# Patient Record
Sex: MALE | Race: BLACK OR AFRICAN AMERICAN | NOT HISPANIC OR LATINO | URBAN - METROPOLITAN AREA
[De-identification: names, ages, dates, MRNs, and addresses within clinical notes are randomized per-mention and may not be internally consistent; named-entity substitution may affect disease eponyms.]

---

## 2023-12-28 ENCOUNTER — HOSPITAL ENCOUNTER (EMERGENCY)
Facility: HOSPITAL | Age: 58
Discharge: HOME | End: 2023-12-28

## 2023-12-28 VITALS
BODY MASS INDEX: 21.82 KG/M2 | WEIGHT: 170 LBS | TEMPERATURE: 98.4 F | HEIGHT: 74 IN | SYSTOLIC BLOOD PRESSURE: 143 MMHG | DIASTOLIC BLOOD PRESSURE: 86 MMHG | HEART RATE: 95 BPM | RESPIRATION RATE: 16 BRPM | OXYGEN SATURATION: 99 %

## 2023-12-28 DIAGNOSIS — L72.3 SEBACEOUS CYST: Primary | ICD-10-CM

## 2023-12-28 PROCEDURE — 99284 EMERGENCY DEPT VISIT MOD MDM: CPT | Performed by: PHYSICIAN ASSISTANT

## 2023-12-28 PROCEDURE — 2500000005 HC RX 250 GENERAL PHARMACY W/O HCPCS: Performed by: PHYSICIAN ASSISTANT

## 2023-12-28 PROCEDURE — 10060 I&D ABSCESS SIMPLE/SINGLE: CPT | Performed by: PHYSICIAN ASSISTANT

## 2023-12-28 PROCEDURE — 99283 EMERGENCY DEPT VISIT LOW MDM: CPT

## 2023-12-28 RX ORDER — KETOROLAC TROMETHAMINE 10 MG/1
10 TABLET, FILM COATED ORAL EVERY 6 HOURS PRN
Qty: 20 TABLET | Refills: 0 | Status: SHIPPED | OUTPATIENT
Start: 2023-12-28

## 2023-12-28 RX ORDER — CEPHALEXIN 500 MG/1
500 CAPSULE ORAL 4 TIMES DAILY
Qty: 28 CAPSULE | Refills: 0 | Status: SHIPPED | OUTPATIENT
Start: 2023-12-28 | End: 2024-01-04

## 2023-12-28 RX ORDER — LIDOCAINE HYDROCHLORIDE 10 MG/ML
5 INJECTION INFILTRATION; PERINEURAL ONCE
Status: COMPLETED | OUTPATIENT
Start: 2023-12-28 | End: 2023-12-28

## 2023-12-28 RX ORDER — SULFAMETHOXAZOLE AND TRIMETHOPRIM 800; 160 MG/1; MG/1
1 TABLET ORAL 2 TIMES DAILY
Qty: 14 TABLET | Refills: 0 | Status: SHIPPED | OUTPATIENT
Start: 2023-12-28 | End: 2024-01-04

## 2023-12-28 RX ADMIN — LIDOCAINE HYDROCHLORIDE 50 MG: 10 INJECTION, SOLUTION INFILTRATION; PERINEURAL at 14:25

## 2023-12-28 ASSESSMENT — COLUMBIA-SUICIDE SEVERITY RATING SCALE - C-SSRS
1. IN THE PAST MONTH, HAVE YOU WISHED YOU WERE DEAD OR WISHED YOU COULD GO TO SLEEP AND NOT WAKE UP?: NO
2. HAVE YOU ACTUALLY HAD ANY THOUGHTS OF KILLING YOURSELF?: NO
6. HAVE YOU EVER DONE ANYTHING, STARTED TO DO ANYTHING, OR PREPARED TO DO ANYTHING TO END YOUR LIFE?: NO

## 2023-12-28 NOTE — ED PROVIDER NOTES
"HPI   Chief Complaint   Patient presents with    Abscess       This is a 58-year-old male with no significant past medical history who presents with concern for a \"bump\" to his posterior neck.  Was there for about a year, states he saw a doctor but was told that it will eventually go away.  A few days ago, he states it busted open with expression of thick yellow material.  Denies fever, chills, pain radiating to the arms, nausea, vomiting, extremity numbness.                          No data recorded                Patient History   Past Medical History:   Diagnosis Date    Unspecified sexually transmitted disease     STD (male)     Past Surgical History:   Procedure Laterality Date    OTHER SURGICAL HISTORY  05/12/2017    Surgery Scrotum Excision Of Lesion    OTHER SURGICAL HISTORY  05/12/2017    Therapeutic Cystoscopy     No family history on file.  Social History     Tobacco Use    Smoking status: Not on file    Smokeless tobacco: Not on file   Substance Use Topics    Alcohol use: Not on file    Drug use: Not on file       Physical Exam   ED Triage Vitals [12/28/23 1319]   Temp Heart Rate Resp BP   36.9 °C (98.4 °F) 95 16 143/86      SpO2 Temp Source Heart Rate Source Patient Position   99 % Tympanic -- --      BP Location FiO2 (%)     -- --       Physical Exam  Vitals and nursing note reviewed.   Constitutional:       Appearance: Normal appearance.   HENT:      Head: Normocephalic and atraumatic.   Musculoskeletal:         General: Normal range of motion.   Skin:     Comments: 4 cm area of fluctuance in the posterior neck with some erythema, no warmth, open centrally, tender to palpation   Neurological:      Mental Status: He is alert and oriented to person, place, and time.         ED Course & MDM   Diagnoses as of 12/28/23 1448   Sebaceous cyst       Medical Decision Making  58-year-old male, is alert and oriented x 3, afebrile and hemodynamically stable.  Presenting with concern for bump to his posterior " neck.    Examination reveals large area of erythema and fluctuance approximately 4 cm in diameter at the posterior neck with an open area centrally, tender to palpation.  The wound was anesthetized using 1% lidocaine without epinephrine, subsequently palpated with expression of thick white/yellow cheesy material consistent with a sebaceous cyst.  The wound was packed with quarter inch iodoform gauze strip, will prescribe the patient a course of Keflex and Bactrim, given a prescription for Toradol and was discharged in stable condition, advised to follow-up with his primary care provider within the next week for a wound check, given emergency department return precautions.        Procedure  Procedures     Darrell Porter PA-C  12/28/23 1453

## 2023-12-28 NOTE — ED TRIAGE NOTES
Pt c/o abscess on back of neck x1 year. Pt states seen by PCP for it when it first appeared and was told it would go away on its own. Pt states ruptured on 12/24 with yellow/brown/bloody drainage. Pt endorses N/V/HA.

## 2023-12-28 NOTE — Clinical Note
Monty Pacheco was seen and treated in our emergency department on 12/28/2023.  He may return to school on 01/02/2024.      If you have any questions or concerns, please don't hesitate to call.      Darrell Porter PA-C

## 2023-12-28 NOTE — ED PROCEDURE NOTE
Procedure  Incision and Drainage    Performed by: Darrell Porter PA-C  Authorized by: Darrell Porter PA-C    Consent:     Consent obtained:  Verbal  Universal protocol:     Patient identity confirmed:  Verbally with patient  Location:     Type:  Cyst    Size:  4 cm    Location: posterior neck.  Pre-procedure details:     Skin preparation:  Chlorhexidine  Anesthesia:     Anesthesia method:  Local infiltration    Local anesthetic:  Lidocaine 1% w/o epi  Procedure type:     Complexity:  Simple  Procedure details:     Incision depth:  Dermal    Wound management:  Probed and deloculated    Drainage characteristics: yellow cheesy material.    Drainage amount:  Moderate    Wound treatment:  Drain placed    Packing materials:  1/4 in iodoform gauze  Post-procedure details:     Procedure completion:  Tolerated               Darrell Porter PA-C  12/28/23 1453